# Patient Record
Sex: FEMALE | Race: WHITE | NOT HISPANIC OR LATINO | Employment: OTHER | URBAN - METROPOLITAN AREA
[De-identification: names, ages, dates, MRNs, and addresses within clinical notes are randomized per-mention and may not be internally consistent; named-entity substitution may affect disease eponyms.]

---

## 2017-01-27 ENCOUNTER — TRANSCRIBE ORDERS (OUTPATIENT)
Dept: DIABETES SERVICES | Facility: HOSPITAL | Age: 67
End: 2017-01-27

## 2017-01-27 DIAGNOSIS — R73.9 HYPERGLYCEMIA, UNSPECIFIED: Primary | ICD-10-CM

## 2017-02-07 ENCOUNTER — APPOINTMENT (OUTPATIENT)
Dept: DIABETES SERVICES | Facility: HOSPITAL | Age: 67
End: 2017-02-07

## 2017-02-07 PROCEDURE — 97802 MEDICAL NUTRITION INDIV IN: CPT | Performed by: DIETITIAN, REGISTERED

## 2017-03-07 ENCOUNTER — APPOINTMENT (OUTPATIENT)
Dept: DIABETES SERVICES | Facility: HOSPITAL | Age: 67
End: 2017-03-07

## 2017-04-04 ENCOUNTER — APPOINTMENT (OUTPATIENT)
Dept: DIABETES SERVICES | Facility: HOSPITAL | Age: 67
End: 2017-04-04

## 2017-06-30 ENCOUNTER — TRANSCRIBE ORDERS (OUTPATIENT)
Dept: ADMINISTRATIVE | Facility: HOSPITAL | Age: 67
End: 2017-06-30

## 2017-06-30 DIAGNOSIS — Z12.31 VISIT FOR SCREENING MAMMOGRAM: Primary | ICD-10-CM

## 2017-07-18 ENCOUNTER — HOSPITAL ENCOUNTER (OUTPATIENT)
Dept: MAMMOGRAPHY | Facility: HOSPITAL | Age: 67
Discharge: HOME | End: 2017-07-18
Admitting: NURSE PRACTITIONER

## 2017-07-18 ENCOUNTER — APPOINTMENT (OUTPATIENT)
Dept: OTHER | Facility: HOSPITAL | Age: 67
End: 2017-07-18

## 2017-07-18 DIAGNOSIS — Z92.89 H/O MAMMOGRAM: ICD-10-CM

## 2017-07-18 DIAGNOSIS — Z12.31 VISIT FOR SCREENING MAMMOGRAM: ICD-10-CM

## 2017-07-18 PROCEDURE — G0202 SCR MAMMO BI INCL CAD: HCPCS | Performed by: RADIOLOGY

## 2017-07-18 PROCEDURE — 77063 BREAST TOMOSYNTHESIS BI: CPT | Performed by: RADIOLOGY

## 2017-07-18 PROCEDURE — 77063 BREAST TOMOSYNTHESIS BI: CPT

## 2017-07-18 PROCEDURE — G0202 SCR MAMMO BI INCL CAD: HCPCS

## 2017-07-27 ENCOUNTER — TRANSCRIBE ORDERS (OUTPATIENT)
Dept: MAMMOGRAPHY | Facility: HOSPITAL | Age: 67
End: 2017-07-27

## 2017-07-27 ENCOUNTER — HOSPITAL ENCOUNTER (OUTPATIENT)
Dept: MAMMOGRAPHY | Facility: HOSPITAL | Age: 67
Discharge: HOME | End: 2017-07-27
Admitting: NURSE PRACTITIONER

## 2017-07-27 DIAGNOSIS — R92.8 ABNORMAL MAMMOGRAM: ICD-10-CM

## 2017-07-27 DIAGNOSIS — R92.8 ABNORMAL MAMMOGRAM: Primary | ICD-10-CM

## 2017-07-27 PROCEDURE — G0206 DX MAMMO INCL CAD UNI: HCPCS

## 2017-07-27 PROCEDURE — G0206 DX MAMMO INCL CAD UNI: HCPCS | Performed by: RADIOLOGY

## 2018-01-29 ENCOUNTER — TRANSCRIBE ORDERS (OUTPATIENT)
Dept: MAMMOGRAPHY | Facility: HOSPITAL | Age: 68
End: 2018-01-29

## 2018-01-29 ENCOUNTER — HOSPITAL ENCOUNTER (OUTPATIENT)
Dept: MAMMOGRAPHY | Facility: HOSPITAL | Age: 68
Discharge: HOME | End: 2018-01-29
Admitting: NURSE PRACTITIONER

## 2018-01-29 DIAGNOSIS — R92.8 ABNORMAL MAMMOGRAM: ICD-10-CM

## 2018-01-29 DIAGNOSIS — R92.8 ABNORMAL MAMMOGRAM: Primary | ICD-10-CM

## 2018-01-29 PROCEDURE — 77065 DX MAMMO INCL CAD UNI: CPT

## 2018-01-29 PROCEDURE — 77065 DX MAMMO INCL CAD UNI: CPT | Performed by: RADIOLOGY

## 2018-07-31 ENCOUNTER — APPOINTMENT (OUTPATIENT)
Dept: MAMMOGRAPHY | Facility: HOSPITAL | Age: 68
End: 2018-07-31

## 2018-08-06 ENCOUNTER — TRANSCRIBE ORDERS (OUTPATIENT)
Dept: MAMMOGRAPHY | Facility: HOSPITAL | Age: 68
End: 2018-08-06

## 2018-08-06 ENCOUNTER — HOSPITAL ENCOUNTER (OUTPATIENT)
Dept: MAMMOGRAPHY | Facility: HOSPITAL | Age: 68
Discharge: HOME | End: 2018-08-06
Admitting: NURSE PRACTITIONER

## 2018-08-06 DIAGNOSIS — R92.8 ABNORMAL MAMMOGRAM: Primary | ICD-10-CM

## 2018-08-06 DIAGNOSIS — R92.8 ABNORMAL MAMMOGRAM: ICD-10-CM

## 2018-08-06 PROCEDURE — G0279 TOMOSYNTHESIS, MAMMO: HCPCS | Performed by: RADIOLOGY

## 2018-08-06 PROCEDURE — 77066 DX MAMMO INCL CAD BI: CPT | Performed by: RADIOLOGY

## 2018-08-06 PROCEDURE — 77066 DX MAMMO INCL CAD BI: CPT

## 2018-08-06 PROCEDURE — G0279 TOMOSYNTHESIS, MAMMO: HCPCS

## 2018-09-14 ENCOUNTER — OFFICE VISIT (OUTPATIENT)
Dept: BARIATRICS/WEIGHT MGMT | Facility: CLINIC | Age: 68
End: 2018-09-14

## 2018-09-14 VITALS
TEMPERATURE: 98.1 F | HEIGHT: 64 IN | OXYGEN SATURATION: 99 % | SYSTOLIC BLOOD PRESSURE: 128 MMHG | WEIGHT: 257 LBS | HEART RATE: 78 BPM | BODY MASS INDEX: 43.87 KG/M2 | RESPIRATION RATE: 18 BRPM | DIASTOLIC BLOOD PRESSURE: 82 MMHG

## 2018-09-14 DIAGNOSIS — K21.9 GASTROESOPHAGEAL REFLUX DISEASE, ESOPHAGITIS PRESENCE NOT SPECIFIED: Primary | ICD-10-CM

## 2018-09-14 DIAGNOSIS — G47.33 OBSTRUCTIVE SLEEP APNEA SYNDROME: ICD-10-CM

## 2018-09-14 DIAGNOSIS — I10 ESSENTIAL HYPERTENSION: ICD-10-CM

## 2018-09-14 DIAGNOSIS — R13.10 DYSPHAGIA, UNSPECIFIED TYPE: ICD-10-CM

## 2018-09-14 DIAGNOSIS — M35.3 PMR (POLYMYALGIA RHEUMATICA) (HCC): ICD-10-CM

## 2018-09-14 DIAGNOSIS — E66.01 OBESITY, CLASS III, BMI 40-49.9 (MORBID OBESITY) (HCC): ICD-10-CM

## 2018-09-14 PROCEDURE — 99205 OFFICE O/P NEW HI 60 MIN: CPT | Performed by: SURGERY

## 2018-09-14 RX ORDER — TRAMADOL HYDROCHLORIDE 50 MG/1
50 TABLET ORAL EVERY 4 HOURS PRN
COMMUNITY

## 2018-09-14 RX ORDER — FOLIC ACID 1 MG/1
TABLET ORAL
COMMUNITY

## 2018-09-14 RX ORDER — LISINOPRIL AND HYDROCHLOROTHIAZIDE 25; 20 MG/1; MG/1
TABLET ORAL
COMMUNITY
End: 2018-09-20

## 2018-09-14 RX ORDER — LEVOTHYROXINE SODIUM 0.15 MG/1
150 TABLET ORAL DAILY
COMMUNITY

## 2018-09-14 RX ORDER — COVID-19 ANTIGEN TEST
KIT MISCELLANEOUS
COMMUNITY
End: 2018-09-14

## 2018-09-14 RX ORDER — METHYLPREDNISOLONE ACETATE 40 MG/ML
1 INJECTION, SUSPENSION INTRA-ARTICULAR; INTRALESIONAL; INTRAMUSCULAR; SOFT TISSUE
COMMUNITY
End: 2018-09-14

## 2018-09-14 RX ORDER — PREDNISONE 2.5 MG
TABLET ORAL
COMMUNITY

## 2018-09-18 ENCOUNTER — HOSPITAL ENCOUNTER (OUTPATIENT)
Dept: GENERAL RADIOLOGY | Facility: HOSPITAL | Age: 68
Discharge: HOME | End: 2018-09-18
Attending: SURGERY | Admitting: SURGERY

## 2018-09-18 ENCOUNTER — TELEPHONE (OUTPATIENT)
Dept: BARIATRICS/WEIGHT MGMT | Facility: CLINIC | Age: 68
End: 2018-09-18

## 2018-09-18 DIAGNOSIS — R13.10 DYSPHAGIA, UNSPECIFIED TYPE: ICD-10-CM

## 2018-09-18 PROCEDURE — 74241: CPT

## 2018-09-18 RX ADMIN — BARIUM SULFATE 183 ML: 960 POWDER, FOR SUSPENSION ORAL at 09:36

## 2018-09-19 DIAGNOSIS — R13.10 DYSPHAGIA, UNSPECIFIED TYPE: Primary | ICD-10-CM

## 2018-09-19 RX ORDER — SODIUM CHLORIDE 9 MG/ML
150 INJECTION, SOLUTION INTRAVENOUS CONTINUOUS
Status: CANCELLED | OUTPATIENT
Start: 2018-09-19

## 2018-09-19 NOTE — H&P
"[facility] Bariatric Surgery  [address]  [facility] [address] [phone]           Patient Name:  Erica Suggs.  :  1950        Date of Visit: 2018        Reason for Visit:   12 years postop AGB, weight regain, port pain     HPI: Erica Suggs is a 67 y.o. female s/p 2006 AGB (likely 4 cc capacity) by [facility], assisted by GDW.  Has lost 22 pounds recently on WW.  Highest weight before band 319, lowest weight was 250, gained up to 310, now down to 257.  Had had some recent LUQ pain that caused her to have to lie down and stretch out.  Has had port pain in the past so bad she couldn't touch the port, but this spontaneously resolved.  Occurred last week. Thinks she was seen around .  She thinks port is emptied.  +GERD this week.  Has required ranitidine past few nights.  Some dysphagia in the morning and with cold liquids x many years.  Either has to vomit the food or be miserable until it passes.  Worse with cold liquids and solid proteins.       In , per patient, the UGI was \"not good,\" and she didn't followup.  She is seeking band removal for chronic band intolerance.  She notes previous attempts at band access were difficult.       Has been on chronic steroids for polymyalgia rheumatica, on 5 mg daily prednisone for life, started 1 year ago on this dose. Follows with rheumatologist Dr. Carnes.  Also receives PRN prednisone shots.       Aware Dr. Samuels is practicing in [place], prefers to follow at this practice because it is closer to her home in [place].          Medical History        Past Medical History:   Diagnosis Date   • Back pain     • Fatigue     • Hypertension     • Hypothyroidism     • DANYEL (obstructive sleep apnea)       has not used CPAP since lost 20 pounds   • Osteoarthritis     • PMR (polymyalgia rheumatica) (CMS/[facility])           Surgical History         Past Surgical History:   Procedure Laterality Date   • BREAST EXCISIONAL " "BIOPSY Right 1992   • FRONTALIS SUSPENSION         eyelid lift   • GASTRIC BANDING       • LAPAROSCOPIC CHOLECYSTECTOMY         [facility], [place]         Active Medications          Outpatient Prescriptions Marked as Taking for the 9/14/18 encounter (Office Visit) with Milagros Watkins MD   Medication Sig Dispense Refill   • folic acid (FOLVITE) 1 MG tablet folic acid 1 mg tablet   Take 1 tablet every day by oral route.       • levothyroxine (SYNTHROID, LEVOTHROID) 150 MCG tablet Take 150 mcg by mouth Daily.       • lisinopril-hydrochlorothiazide (PRINZIDE,ZESTORETIC) 20-25 MG per tablet lisinopril 20 mg-hydrochlorothiazide 25 mg tablet   Take 1 tablet every day by oral route.       • methotrexate 2.5 MG tablet Take 10 mg by mouth 1 (One) Time Per Week.       • predniSONE (DELTASONE) 2.5 MG tablet prednisone 2.5 mg tablet   take 2 tablets daily       • traMADol (ULTRAM) 50 MG tablet Every 12 (Twelve) Hours.       • [DISCONTINUED] dextrose 5 % solution with levothyroxine sodium 100 MCG reconstituted solution 200 mcg IVPB levothyroxine   1 daily                Allergies no known allergies     Social History   Social History            Social History   • Marital status:        Spouse name: N/A   • Number of children: N/A   • Years of education: N/A          Occupational History   • Not on file.           Social History Main Topics   • Smoking status: Never Smoker   • Smokeless tobacco: Not on file   • Alcohol use No   • Drug use: No   • Sexual activity: Not on file           Other Topics Concern   • Not on file          Social History Narrative     Retired from [employer], .  Lives with .               /82 (BP Location: Left arm, Patient Position: Sitting, Cuff Size: Large Adult)   Pulse 78   Temp 98.1 °F (36.7 °C) (Temporal Artery )   Resp 18   Ht 161.3 cm (63.5\")   Wt 117 kg (257 lb)   SpO2 99%   BMI 44.81 kg/m²      Physical Exam   Constitutional: " She is oriented to person, place, and time. She appears well-developed and well-nourished. No distress.   HENT:   Head: Normocephalic and atraumatic.   Mouth/Throat: No oropharyngeal exudate.   Eyes: Pupils are equal, round, and reactive to light. Conjunctivae and EOM are normal.   Cardiovascular: Normal rate.    Pulmonary/Chest: Effort normal. No respiratory distress.   Abdominal: Soft.   Palpable port in LUQ, nontender/no erythema, scattered lap scars   Neurological: She is alert and oriented to person, place, and time. No cranial nerve deficit.   Skin: Skin is warm and dry. No rash noted. She is not diaphoretic. No erythema.   Psychiatric: She has a normal mood and affect. Her behavior is normal. Judgment and thought content normal.            Assessment:  12 years s/p 5/17/2006 AGB (likely 4 cc capacity) by [facility], assisted by GDW, now with GERD, port pain, dysphagia      ICD-10-CM ICD-9-CM   1. Gastroesophageal reflux disease, esophagitis presence not specified K21.9 530.81   2. Dysphagia, unspecified type R13.10 787.20   3. Essential hypertension I10 401.9   4. PMR (polymyalgia rheumatica) (CMS/[facility]) M35.3 725   5. Obstructive sleep apnea syndrome G47.33 327.23   6. Obesity, Class III, BMI 40-49.9 (morbid obesity) (CMS/[facility]) E66.01 278.01            Plan:    UGI, likely EGD afterwards.       MDM: high  Moderate, 4+ chronic diagnoses  Elective procedure, risk factor of HTN  Has not been seen >2 years, new patient visit.     Milagros Watkins MD      09/19/18 Update:    UGI reviewed.  Horizontal band, large stomach above band, c/w slip.  Will do EGD ASAP at  [facility].  Plan for ABGR as soon as approved by insurance.

## 2018-09-20 ENCOUNTER — ANESTHESIA EVENT (OUTPATIENT)
Dept: GASTROENTEROLOGY | Facility: HOSPITAL | Age: 68
End: 2018-09-20

## 2018-09-20 ENCOUNTER — APPOINTMENT (OUTPATIENT)
Dept: PREADMISSION TESTING | Facility: HOSPITAL | Age: 68
End: 2018-09-20

## 2018-09-20 PROBLEM — R13.10 DYSPHAGIA: Status: ACTIVE | Noted: 2018-09-20

## 2018-09-20 LAB
DEPRECATED RDW RBC AUTO: 44.4 FL (ref 37–54)
ERYTHROCYTE [DISTWIDTH] IN BLOOD BY AUTOMATED COUNT: 13.6 % (ref 11.3–14.5)
HCT VFR BLD AUTO: 38.4 % (ref 34.5–44)
HGB BLD-MCNC: 12.6 G/DL (ref 11.5–15.5)
MCH RBC QN AUTO: 29.6 PG (ref 27–31)
MCHC RBC AUTO-ENTMCNC: 32.8 G/DL (ref 32–36)
MCV RBC AUTO: 90.1 FL (ref 80–99)
PLATELET # BLD AUTO: 390 10*3/MM3 (ref 150–450)
PMV BLD AUTO: 10.1 FL (ref 6–12)
POTASSIUM BLD-SCNC: 4.4 MMOL/L (ref 3.5–5.5)
RBC # BLD AUTO: 4.26 10*6/MM3 (ref 3.89–5.14)
WBC NRBC COR # BLD: 14.45 10*3/MM3 (ref 3.5–10.8)

## 2018-09-20 PROCEDURE — 93005 ELECTROCARDIOGRAM TRACING: CPT

## 2018-09-20 PROCEDURE — 36415 COLL VENOUS BLD VENIPUNCTURE: CPT

## 2018-09-20 PROCEDURE — 93010 ELECTROCARDIOGRAM REPORT: CPT | Performed by: INTERNAL MEDICINE

## 2018-09-20 PROCEDURE — 84132 ASSAY OF SERUM POTASSIUM: CPT | Performed by: ANESTHESIOLOGY

## 2018-09-20 PROCEDURE — 85027 COMPLETE CBC AUTOMATED: CPT | Performed by: ANESTHESIOLOGY

## 2018-09-20 RX ORDER — LISINOPRIL AND HYDROCHLOROTHIAZIDE 20; 12.5 MG/1; MG/1
1 TABLET ORAL DAILY
COMMUNITY

## 2018-09-20 RX ORDER — AMITRIPTYLINE HYDROCHLORIDE 25 MG/1
25 TABLET, FILM COATED ORAL NIGHTLY PRN
COMMUNITY

## 2018-09-20 NOTE — PAT
pt has questions for patel before signing consent and has questions for anesthesia since has had issues in past.

## 2018-09-20 NOTE — DISCHARGE INSTRUCTIONS

## 2018-09-21 ENCOUNTER — HOSPITAL ENCOUNTER (OUTPATIENT)
Facility: HOSPITAL | Age: 68
Setting detail: HOSPITAL OUTPATIENT SURGERY
Discharge: HOME | End: 2018-09-21
Attending: SURGERY | Admitting: SURGERY

## 2018-09-21 ENCOUNTER — ANESTHESIA (OUTPATIENT)
Dept: GASTROENTEROLOGY | Facility: HOSPITAL | Age: 68
End: 2018-09-21

## 2018-09-21 VITALS
OXYGEN SATURATION: 96 % | DIASTOLIC BLOOD PRESSURE: 71 MMHG | RESPIRATION RATE: 18 BRPM | SYSTOLIC BLOOD PRESSURE: 116 MMHG | HEART RATE: 90 BPM | TEMPERATURE: 97.7 F

## 2018-09-21 DIAGNOSIS — R13.10 DYSPHAGIA, UNSPECIFIED TYPE: ICD-10-CM

## 2018-09-21 DIAGNOSIS — K21.9 GASTROESOPHAGEAL REFLUX DISEASE, ESOPHAGITIS PRESENCE NOT SPECIFIED: ICD-10-CM

## 2018-09-21 PROCEDURE — 43239 EGD BIOPSY SINGLE/MULTIPLE: CPT | Performed by: SURGERY

## 2018-09-21 PROCEDURE — 88305 TISSUE EXAM BY PATHOLOGIST: CPT | Performed by: SURGERY

## 2018-09-21 PROCEDURE — 25010000002 PROPOFOL 1000 MG/ML EMULSION: Performed by: NURSE ANESTHETIST, CERTIFIED REGISTERED

## 2018-09-21 RX ORDER — FAMOTIDINE 20 MG/1
20 TABLET, FILM COATED ORAL ONCE
Status: DISCONTINUED | OUTPATIENT
Start: 2018-09-21 | End: 2018-09-21 | Stop reason: HOSPADM

## 2018-09-21 RX ORDER — SODIUM CHLORIDE 9 MG/ML
150 INJECTION, SOLUTION INTRAVENOUS CONTINUOUS
Status: DISCONTINUED | OUTPATIENT
Start: 2018-09-21 | End: 2018-09-21 | Stop reason: HOSPADM

## 2018-09-21 RX ORDER — PROMETHAZINE HYDROCHLORIDE 25 MG/ML
12.5 INJECTION, SOLUTION INTRAMUSCULAR; INTRAVENOUS ONCE AS NEEDED
Status: DISCONTINUED | OUTPATIENT
Start: 2018-09-21 | End: 2018-09-21 | Stop reason: HOSPADM

## 2018-09-21 RX ORDER — SODIUM CHLORIDE 0.9 % (FLUSH) 0.9 %
1-10 SYRINGE (ML) INJECTION AS NEEDED
Status: DISCONTINUED | OUTPATIENT
Start: 2018-09-21 | End: 2018-09-21 | Stop reason: HOSPADM

## 2018-09-21 RX ORDER — LIDOCAINE HYDROCHLORIDE 10 MG/ML
0.5 INJECTION, SOLUTION EPIDURAL; INFILTRATION; INTRACAUDAL; PERINEURAL ONCE AS NEEDED
Status: DISCONTINUED | OUTPATIENT
Start: 2018-09-21 | End: 2018-09-21 | Stop reason: HOSPADM

## 2018-09-21 RX ORDER — FENTANYL CITRATE 50 UG/ML
50 INJECTION, SOLUTION INTRAMUSCULAR; INTRAVENOUS
Status: DISCONTINUED | OUTPATIENT
Start: 2018-09-21 | End: 2018-09-21 | Stop reason: HOSPADM

## 2018-09-21 RX ORDER — SODIUM CHLORIDE, SODIUM LACTATE, POTASSIUM CHLORIDE, CALCIUM CHLORIDE 600; 310; 30; 20 MG/100ML; MG/100ML; MG/100ML; MG/100ML
9 INJECTION, SOLUTION INTRAVENOUS CONTINUOUS
Status: DISCONTINUED | OUTPATIENT
Start: 2018-09-21 | End: 2018-09-21 | Stop reason: HOSPADM

## 2018-09-21 RX ORDER — ONDANSETRON 2 MG/ML
4 INJECTION INTRAMUSCULAR; INTRAVENOUS ONCE AS NEEDED
Status: DISCONTINUED | OUTPATIENT
Start: 2018-09-21 | End: 2018-09-21 | Stop reason: HOSPADM

## 2018-09-21 RX ORDER — FAMOTIDINE 10 MG/ML
20 INJECTION, SOLUTION INTRAVENOUS ONCE
Status: COMPLETED | OUTPATIENT
Start: 2018-09-21 | End: 2018-09-21

## 2018-09-21 RX ADMIN — PROPOFOL 150 MCG/KG/MIN: 10 INJECTION, EMULSION INTRAVENOUS at 09:03

## 2018-09-21 RX ADMIN — FAMOTIDINE 20 MG: 10 INJECTION, SOLUTION INTRAVENOUS at 08:08

## 2018-09-21 RX ADMIN — SODIUM CHLORIDE 500 ML: 9 INJECTION, SOLUTION INTRAVENOUS at 08:09

## 2018-09-21 NOTE — ANESTHESIA POSTPROCEDURE EVALUATION
Patient: Erica Suggs    Procedure Summary     Date:  09/21/18 Room / Location:   [facility]    Anesthesia Start:  0901 Anesthesia Stop:      Procedure:  ESOPHAGOGASTRODUODENOSCOPY WITH BIOPSY (N/A ) Diagnosis:       Dysphagia, unspecified type      (Dysphagia, unspecified type [R13.10])    Surgeon:  Milagros Watkins MD Provider:  Bam Christina MD    Anesthesia Type:  general ASA Status:  3          Anesthesia Type: general  Last vitals  BP   100/62   Temp   98   Pulse   90   Resp   18     SpO2   95     Post Anesthesia Care and Evaluation    Patient location during evaluation: PACU  Patient participation: complete - patient participated  Level of consciousness: awake and responsive to verbal stimuli  Pain score: 2  Pain management: adequate  Airway patency: patent  Anesthetic complications: No anesthetic complications    Cardiovascular status: acceptable  Respiratory status: acceptable  Hydration status: acceptable    Comments: Pt awake and responsive. SV. VSS. Report to RN. Patient Vitals in the past 24 hrs:  09/21/18 0804, BP:142/96, Temp:98.1 °F (36.7 °C), Temp src:Temporal Art, Pulse:99, Resp:20, SpO2:99 %  133/78. p 72. r 16. t 98.1

## 2018-09-21 NOTE — OP NOTE
PROCEDURE NOTE.    Date: 09/21/18    Patient: Erica Suggs     Surgeon: Milagros Watkins MD      Preoperative Diagnosis: Dysphagia, band intolerance     Postoperative Diagnosis: Eroded adjustable gastric band     Procedure Performed: Esophagogastroduodenoscopy with biopsy     Findings: GE junction 38 cm.  No hiatal hernia. Eroded adjustable gastric band, estimated 90% of its circumference intragastric, with associated inflammation and ulceration.  Small fistula, superior to band, that could not be intubated with scope.      Specimens: Antral and distal esophageal biopsies.       Indications: Erica Suggs is a 67 y.o. year old supermorbidly obese female who has history of adjustable gastric band placed 5/17/2006 by Dr. Samuels.  She had not been seen in the office in some time, and has history of daily prednisone use for polymyalgia rheumatica.  She has had symptoms for some time of GERD, dysphagia, vomiting, but they have worsened the past few weeks.  She also has port pain.  UGI was concerning for horizontally oriented band and slip. She is here today for diagnostic endoscopy.       Procedure:      After informed consent was taken, the patient was brought to the operating room and placed in the supine position. MAC was given by anesthesia staff. A bite block was placed and time out performed. A flexible endoscope was passed transorally down to the second portion of the duodenum without difficulty. The scope was slowly withdrawn and the anatomy examined with photodocumentation throughout. The duodenum was normal. The pylorus was without spasm. The antrum of the stomach was without significant gastritis. A biopsy was taken to rule out H. Pylori. The scope was retroflexed and the body, fundus, and cardia were examined. Here, as when I first entered the stomach, could be seen a blackish discolored gastric band, with about 90% of its circumference in the stomach.  The surrounding stomach was inflamed and  ulcerated.  The scope was withdrawn back into the esophagus after decompressing the stomach. The GE junction was at 38 cm and the distal esophagus showed no evidence of reflux esophagitis. Biopsy was taken. There was no obvious hiatal hernia. The scope was further withdrawn. There was no other esophageal abnormality. The vocal cords were normal. The scope was withdrawn completely and the procedure concluded. The patient was awakened and taken to recovery in good condition.      Recommendations: Will contact the patient to set up removal of eroded adjustable gastric band.

## 2018-09-21 NOTE — H&P (VIEW-ONLY)
"[facility] Bariatric Surgery  [address]  [facility] [address] [phone]           Patient Name:  Erica Suggs.  :  1950        Date of Visit: 2018        Reason for Visit:   12 years postop AGB, weight regain, port pain     HPI: Erica Suggs is a 67 y.o. female s/p 2006 AGB (likely 4 cc capacity) by [facility], assisted by GDW.  Has lost 22 pounds recently on WW.  Highest weight before band 319, lowest weight was 250, gained up to 310, now down to 257.  Had had some recent LUQ pain that caused her to have to lie down and stretch out.  Has had port pain in the past so bad she couldn't touch the port, but this spontaneously resolved.  Occurred last week. Thinks she was seen around .  She thinks port is emptied.  +GERD this week.  Has required ranitidine past few nights.  Some dysphagia in the morning and with cold liquids x many years.  Either has to vomit the food or be miserable until it passes.  Worse with cold liquids and solid proteins.       In , per patient, the UGI was \"not good,\" and she didn't followup.  She is seeking band removal for chronic band intolerance.  She notes previous attempts at band access were difficult.       Has been on chronic steroids for polymyalgia rheumatica, on 5 mg daily prednisone for life, started 1 year ago on this dose. Follows with rheumatologist Dr. Carnes.  Also receives PRN prednisone shots.       Aware Dr. Samuels is practicing in [place], prefers to follow at this practice because it is closer to her home in [place].          Medical History        Past Medical History:   Diagnosis Date   • Back pain     • Fatigue     • Hypertension     • Hypothyroidism     • DANYEL (obstructive sleep apnea)       has not used CPAP since lost 20 pounds   • Osteoarthritis     • PMR (polymyalgia rheumatica) (CMS/[facility])           Surgical History         Past Surgical History:   Procedure Laterality Date   • BREAST EXCISIONAL " "BIOPSY Right 1992   • FRONTALIS SUSPENSION         eyelid lift   • GASTRIC BANDING       • LAPAROSCOPIC CHOLECYSTECTOMY         [facility], [place]         Active Medications          Outpatient Prescriptions Marked as Taking for the 9/14/18 encounter (Office Visit) with Milagros Watkins MD   Medication Sig Dispense Refill   • folic acid (FOLVITE) 1 MG tablet folic acid 1 mg tablet   Take 1 tablet every day by oral route.       • levothyroxine (SYNTHROID, LEVOTHROID) 150 MCG tablet Take 150 mcg by mouth Daily.       • lisinopril-hydrochlorothiazide (PRINZIDE,ZESTORETIC) 20-25 MG per tablet lisinopril 20 mg-hydrochlorothiazide 25 mg tablet   Take 1 tablet every day by oral route.       • methotrexate 2.5 MG tablet Take 10 mg by mouth 1 (One) Time Per Week.       • predniSONE (DELTASONE) 2.5 MG tablet prednisone 2.5 mg tablet   take 2 tablets daily       • traMADol (ULTRAM) 50 MG tablet Every 12 (Twelve) Hours.       • [DISCONTINUED] dextrose 5 % solution with levothyroxine sodium 100 MCG reconstituted solution 200 mcg IVPB levothyroxine   1 daily                Allergies no known allergies     Social History   Social History            Social History   • Marital status:        Spouse name: N/A   • Number of children: N/A   • Years of education: N/A          Occupational History   • Not on file.           Social History Main Topics   • Smoking status: Never Smoker   • Smokeless tobacco: Not on file   • Alcohol use No   • Drug use: No   • Sexual activity: Not on file           Other Topics Concern   • Not on file          Social History Narrative     Retired from [employer], .  Lives with .               /82 (BP Location: Left arm, Patient Position: Sitting, Cuff Size: Large Adult)   Pulse 78   Temp 98.1 °F (36.7 °C) (Temporal Artery )   Resp 18   Ht 161.3 cm (63.5\")   Wt 117 kg (257 lb)   SpO2 99%   BMI 44.81 kg/m²      Physical Exam   Constitutional: " She is oriented to person, place, and time. She appears well-developed and well-nourished. No distress.   HENT:   Head: Normocephalic and atraumatic.   Mouth/Throat: No oropharyngeal exudate.   Eyes: Pupils are equal, round, and reactive to light. Conjunctivae and EOM are normal.   Cardiovascular: Normal rate.    Pulmonary/Chest: Effort normal. No respiratory distress.   Abdominal: Soft.   Palpable port in LUQ, nontender/no erythema, scattered lap scars   Neurological: She is alert and oriented to person, place, and time. No cranial nerve deficit.   Skin: Skin is warm and dry. No rash noted. She is not diaphoretic. No erythema.   Psychiatric: She has a normal mood and affect. Her behavior is normal. Judgment and thought content normal.            Assessment:  12 years s/p 5/17/2006 AGB (likely 4 cc capacity) by [facility], assisted by GDW, now with GERD, port pain, dysphagia      ICD-10-CM ICD-9-CM   1. Gastroesophageal reflux disease, esophagitis presence not specified K21.9 530.81   2. Dysphagia, unspecified type R13.10 787.20   3. Essential hypertension I10 401.9   4. PMR (polymyalgia rheumatica) (CMS/[facility]) M35.3 725   5. Obstructive sleep apnea syndrome G47.33 327.23   6. Obesity, Class III, BMI 40-49.9 (morbid obesity) (CMS/[facility]) E66.01 278.01            Plan:    UGI, likely EGD afterwards.       MDM: high  Moderate, 4+ chronic diagnoses  Elective procedure, risk factor of HTN  Has not been seen >2 years, new patient visit.     Milagros Watkins MD      09/19/18 Update:    UGI reviewed.  Horizontal band, large stomach above band, c/w slip.  Will do EGD ASAP at  [facility].  Plan for ABGR as soon as approved by insurance.

## 2018-09-21 NOTE — ANESTHESIA PREPROCEDURE EVALUATION
Anesthesia Evaluation     Patient summary reviewed and Nursing notes reviewed   history of anesthetic complications: PONV  NPO Solid Status: > 8 hours  NPO Liquid Status: > 8 hours           Airway   Mallampati: III  TM distance: >3 FB  Neck ROM: full  Possible difficult intubation  Dental      Pulmonary    (+) shortness of breath, sleep apnea on CPAP,   (-) COPD, asthma, recent URI, not a smoker, lung cancer  Cardiovascular     (+) hypertension,   (-) past MI, CAD, angina, CHF, cardiac stentsDysrhythmias: remote.      Neuro/Psych  GI/Hepatic/Renal/Endo    (+) morbid obesity, GERD,  hypothyroidism,     Musculoskeletal     (+) back pain,   Abdominal    Substance History      OB/GYN          Other   (+) arthritis     ROS/Med Hx Other: Recall under LMAC for colon etc explained that this more likley I under MAC  No recall under GA for lap band                 Anesthesia Plan    ASA 3     general   (Prop(ofol)  intravenous induction   Anesthetic plan, all risks, benefits, and alternatives have been provided, discussed and informed consent has been obtained with: patient.    Plan discussed with CRNA.

## 2018-09-24 LAB
CYTO UR: NORMAL
LAB AP CASE REPORT: NORMAL
LAB AP CLINICAL INFORMATION: NORMAL
PATH REPORT.FINAL DX SPEC: NORMAL
PATH REPORT.GROSS SPEC: NORMAL

## 2018-10-01 ENCOUNTER — TELEPHONE (OUTPATIENT)
Dept: BARIATRICS/WEIGHT MGMT | Facility: CLINIC | Age: 68
End: 2018-10-01

## 2018-10-01 NOTE — TELEPHONE ENCOUNTER
Notified pt that her EGD showed an eroded band which needs to be removed and her bx showed mild irration of the stomach and a normal esophagus. Pt verbalized understanding.

## 2018-10-01 NOTE — TELEPHONE ENCOUNTER
Notified pt that she needs to come into the office to have an updated H&P and to discuss her options.  I let pt know that Dr. Samuels her original surgeon is now in [place] if she prefers to go there, but if she prefers to stay in [place] you propose endoscopic band removal and you need to give her some info. Pt stated that she wants to stay in [place].  Pt is scheduling with Cris now to do an updated H&P with you.

## 2018-10-01 NOTE — TELEPHONE ENCOUNTER
Pt wants to know the results of her EGD and Biopsy.  Pt stated she doesn't want to wait till her appointment to know the results.  Please advise, thank you.

## 2018-10-02 ENCOUNTER — OFFICE VISIT (OUTPATIENT)
Dept: BARIATRICS/WEIGHT MGMT | Facility: CLINIC | Age: 68
End: 2018-10-02

## 2018-10-02 VITALS
WEIGHT: 251.5 LBS | BODY MASS INDEX: 42.94 KG/M2 | HEIGHT: 64 IN | RESPIRATION RATE: 18 BRPM | TEMPERATURE: 97.8 F | DIASTOLIC BLOOD PRESSURE: 72 MMHG | OXYGEN SATURATION: 99 % | SYSTOLIC BLOOD PRESSURE: 120 MMHG | HEART RATE: 120 BPM

## 2018-10-02 DIAGNOSIS — K95.09 GASTRIC BAND EROSION: Primary | ICD-10-CM

## 2018-10-02 PROCEDURE — 99214 OFFICE O/P EST MOD 30 MIN: CPT | Performed by: PHYSICIAN ASSISTANT

## 2018-10-09 DIAGNOSIS — K95.09 COMPLICATION OF GASTRIC BAND PROCEDURE: Primary | ICD-10-CM

## 2018-10-09 NOTE — PROGRESS NOTES
"[facility] Bariatric Surgery  [address]  [facility] [address] [phone]        Patient Name:  Erica Suggs.  :  1950      Date of Visit: 10/02/2018      Reason for Visit:  AGB followup    HPI:  Erica Suggs is a 67 y.o. female s/p LAGB REG 2006 [facility]    Presented 18 after extended absence.  Saw Dr. Watkins.  Aware Dr. Samuels is practicing in [place], but said preferred to follow here as it was closer to home.  Wanted to discuss band removal. c/o LUQ pain, intermittent port pain, chronic dysphagia/vomiting.  Further eval includes:    UGI 18 @ [facility] - reviewed by Dr. Watkins - band horizontal w/ large, gas-filled pouch above.    EGD 18 w/ Dr. Watkins revealed \"eroded adjustable gastric band, estimated 90% of its circumference intragastric, with associated inflammation and ulceration.  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 GASTRIC ANTRUM BIOPSY:  Antral and body mucosa with congestion and mild chronic inflammation.  Negative for Helicobacter pylori, intestinal metaplasia, dysplasia or malignancy.   2. DISTAL ESOPHAGUS BIOPSY:  Squamous mucosa with no diagnostic abnormality.  No intraepithelial eosinophils.  Negative for intestinal metaplasia, dysplasia or malignancy.     Gastric band removal advised.  Patient added on today (per patient request) to discuss findings further.  She is anxious and worried and wants band removed as soon as possible.  Symptoms unchanged.  Currently denies port pain.  No fevers/chills.      Past Medical History:   Diagnosis Date   • Anesthesia complication     wakes up during surgery since pt has shallow breathing and has to give less meds so starts waking up and feeling pain- talk with patient before surgery    • Back pain    • Cataract     right - mild still present    • Fatigue    • GERD (gastroesophageal reflux disease)    • Hypertension    • " Hypothyroidism    • Osteoarthritis    • PMR (polymyalgia rheumatica) (CMS/[facility])     daily Prednisone 5mg, w/ wkly MTX, followed by Dr. Carnes @ [facility]   • PONV (postoperative nausea and vomiting)     meds helped   • Right knee pain    • Sleep apnea     doesnt use machine    • SOBOE (shortness of breath on exertion)    • Tooth wear     implants top x2- right side side by side   • Wears glasses      Past Surgical History:   Procedure Laterality Date   • BREAST EXCISIONAL BIOPSY Right 1992   • COLONOSCOPY     • ENDOSCOPY N/A 9/21/2018    Procedure: ESOPHAGOGASTRODUODENOSCOPY WITH BIOPSY;  Surgeon: Milagros Watkins MD;  Location: [facility] END[facility]COPY;  Service: Gastroenterology   • FRONTALIS SUSPENSION      eyelid lift   • GASTRIC BANDING  2006    s/p LAGB REG 5/2006 [facility]   • LAPAROSCOPIC CHOLECYSTECTOMY  2007    [facility]   • WISDOM TOOTH EXTRACTION      all 4       Allergies   Allergen Reactions   • Demerol [Meperidine] Other (See Comments)     Had issues during anesthesia        Outpatient Prescriptions Marked as Taking for the 10/2/18 encounter (Office Visit) with Pinky Bustos PA   Medication Sig Dispense Refill   • amitriptyline (ELAVIL) 25 MG tablet Take 25 mg by mouth At Night As Needed for Sleep.     • Cholecalciferol (VITAMIN D3) 1000 units capsule Take 1 capsule by mouth Daily.     • folic acid (FOLVITE) 1 MG tablet folic acid 1 mg tablet   Take 1 tablet every day by oral route.     • levothyroxine (SYNTHROID, LEVOTHROID) 150 MCG tablet Take 150 mcg by mouth Daily.     • lisinopril-hydrochlorothiazide (PRINZIDE,ZESTORETIC) 20-12.5 MG per tablet Take 1 tablet by mouth Daily.     • methotrexate 2.5 MG tablet Take 10 mg by mouth 1 (One) Time Per Week. Thursday     • Multiple Vitamins-Minerals (OCUVITE EXTRA PO) Take 1 capsule by mouth Daily.     • predniSONE (DELTASONE) 2.5 MG tablet prednisone 2.5 mg tablet   take 2 tablets daily     • traMADol (ULTRAM) 50 MG tablet Take 50 mg by mouth Every 4 (Four) Hours  "As Needed for Moderate Pain .         Social History     Social History   • Marital status:      Spouse name: N/A   • Number of children: N/A   • Years of education: N/A     Occupational History   • Not on file.     Social History Main Topics   • Smoking status: Never Smoker   • Smokeless tobacco: Never Used   • Alcohol use No   • Drug use: No   • Sexual activity: Defer     Other Topics Concern   • Not on file     Social History Narrative    Retired from [employer], .  Lives with  in [place], [place]         /72 (BP Location: Left arm, Patient Position: Sitting, Cuff Size: Large Adult)   Pulse 120   Temp 97.8 °F (36.6 °C) (Temporal Artery )   Resp 18   Ht 161.3 cm (63.5\")   Wt 114 kg (251 lb 8 oz)   SpO2 99%   BMI 43.85 kg/m²     Physical Exam   Constitutional: She appears well-developed and well-nourished. She is cooperative.   HENT:   Mouth/Throat: Oropharynx is clear and moist and mucous membranes are normal.   Eyes: Conjunctivae are normal. No scleral icterus.   Cardiovascular: Normal rate.    Pulmonary/Chest: Effort normal.   Abdominal: Soft. There is no tenderness.   LUQ port - site unremarkable - nontender   Musculoskeletal: Normal range of motion. She exhibits no edema.   Neurological: She is alert.   Skin: Skin is warm and dry. No rash noted.   Psychiatric: She has a normal mood and affect. Judgment normal.       Assessment: 66 y/o s/p LAGB REG 5/2006 [facility] w/ band erosion.      Plan:  Dr. Watkins advises endoscopic band removal w/ open port removal.  Patient did initially request Dr. Guo to perform her surgery, but when I discussed Dr. Watkins's plan for endoscopic band removal she said, \"oh yes, that sounds better.\"  She and her  both agreed that would be their preferred intervention and seemed very comfortable w/ the plan when leaving the office.    Will coordinate w/ Dr. Frazier.  Per Dr. Watkins, will schedule in main OR - 2 hr " case - 23 hr observation.  Will need periop abx + Lovenox. Can continue steroids and methotrexate.  No Tramadol x 1 week prior.  Will schedule once insurance approval obtained and able to coordinate surgery time for both Dr. Watkins and Dr. Frazier.     All was discussed w/ patient.  Additionally instructed her to notify us if symptoms acutely worsened (severe abd.pain, N/V, fevers/chills).  Call w/ any additional issues/concerns.     NADINE Lam

## 2018-10-10 ENCOUNTER — TELEPHONE (OUTPATIENT)
Dept: BARIATRICS/WEIGHT MGMT | Facility: CLINIC | Age: 68
End: 2018-10-10

## 2018-10-10 ENCOUNTER — DOCUMENTATION (OUTPATIENT)
Dept: BARIATRICS/WEIGHT MGMT | Facility: CLINIC | Age: 68
End: 2018-10-10

## 2018-10-10 NOTE — PROGRESS NOTES
10/10/18      Update:      Ms. Suggs is a 66 yo F s/p LAGB REG 5/2006 JSO.  She returned to our office 9/14/18 for followup with complaints of port pain and weight regain.  She previously had not seen her bariatric surgeon in 12 years, and had no recommended yearly surveillance UGIs to make sure no band complications.      Workup was started with UGI on 9/18/18, which was concerning for slip given abnormal band angle.  EGD by me on 9/21/18, which was set up expeditiously due to the findings on her UGI, revealed a 90% circumference erosion of the band with gastric ulceration.      I discussed the findings of the EGD with the patient face-to-face in PACU.  We discussed her high risk for surgery, being on chronic steroids and immunosuppressives, which she did not feel she could stop due to her chronic pain from polymyalgia rheumatica.  I told her her options were to return to her original surgeon in [place], have laparoscopic band removal here, or if available have endoscopic band removal.  She was clear that she wanted to stay in [place] for care, so I asked her to give me some time to coordinate a possible endoscopic removal if possible and she was agreeable.  I told her I would call her when we had more information.  This case was discussed with Dr. Guo, the Bariatric , who agreed with the plan for endoscopic removal of band, as it would be the option with the least risk to her, especially given her chronic steroid use.      I discussed the case with Dr. Frazier of gastroenterology, and my PA also discussed the case with his PA in order to coordinate care.  He was confident that he could remove the band endoscopically, and our offices were coordinating to set this up on a date that we could both be there (he asked me to remove the port at the same time and cut the band tubing so as to aid in endoscopic removal of band).      Today the patient called, very irate, that she was not being listened  to, and reports she is lodging a complaint with administration.  We explained that her port removal/endoscopic band removal would likely take place in about 2 weeks as that is when eric and Dr. Frazier's schedules were the best, and it was not emergent.  Any delay was in the coordination of care between the two offices, and we explained that we are actively communicating to get her procedure done.  She is not interested in waiting or receiving care at [facility] any longer, and is going to see Dr. Andino in [place].      I will communicate with Dr. Frazier today about the cessation of care, and that we do not need his services to remove her band.

## 2018-10-10 NOTE — TELEPHONE ENCOUNTER
Erica Suggs NATHANIEL [birth year] called today to inquire if her band removal had been approved through insurance. I apologized to her that I was unfamiliar with her case. After reviewing her information there was no order for surgery.   The patient became very upset and began saying that we did not care about her and that she can’t believe that our office would treat her so poorly. I once again apologized and let her continue her concerns.  I then explained that I would need to look into the situation and get back with her.   I went to  and asked her if she needed me to pre cert a band removal.  explained that the case was complicated. That the pat needed to have surgery that would involve GI and GW. And that she was working on coordinating the case.  I returned Erica’s call and explained that I had spoken with  and her case was being worked out. Erica said to tell  to STOP working on her case that she would find another doctor. Also that she would be calling [place] to complain about our office. I apologized to her and said I was sorry that she felt that way. She then hung up on me.

## 2019-02-07 ENCOUNTER — APPOINTMENT (OUTPATIENT)
Dept: MAMMOGRAPHY | Facility: HOSPITAL | Age: 69
End: 2019-02-07
Attending: RADIOLOGY

## 2019-05-14 ENCOUNTER — HOSPITAL ENCOUNTER (OUTPATIENT)
Dept: MAMMOGRAPHY | Facility: HOSPITAL | Age: 69
Discharge: HOME | End: 2019-05-14
Attending: RADIOLOGY | Admitting: RADIOLOGY

## 2019-05-14 DIAGNOSIS — R92.8 ABNORMAL MAMMOGRAM: ICD-10-CM

## 2019-05-14 PROCEDURE — G0279 TOMOSYNTHESIS, MAMMO: HCPCS

## 2019-05-14 PROCEDURE — 77065 DX MAMMO INCL CAD UNI: CPT | Performed by: RADIOLOGY

## 2019-05-14 PROCEDURE — 77065 DX MAMMO INCL CAD UNI: CPT

## 2019-07-18 ENCOUNTER — TRANSCRIBE ORDERS (OUTPATIENT)
Dept: ADMINISTRATIVE | Facility: HOSPITAL | Age: 69
End: 2019-07-18

## 2019-07-18 DIAGNOSIS — Z12.31 VISIT FOR SCREENING MAMMOGRAM: Primary | ICD-10-CM

## 2020-05-18 ENCOUNTER — HOSPITAL ENCOUNTER (OUTPATIENT)
Dept: MAMMOGRAPHY | Facility: HOSPITAL | Age: 70
End: 2020-05-18

## 2020-06-17 ENCOUNTER — TRANSCRIBE ORDERS (OUTPATIENT)
Dept: ADMINISTRATIVE | Facility: HOSPITAL | Age: 70
End: 2020-06-17

## 2020-06-17 ENCOUNTER — HOSPITAL ENCOUNTER (OUTPATIENT)
Dept: MAMMOGRAPHY | Facility: HOSPITAL | Age: 70
End: 2020-06-17

## 2020-06-17 DIAGNOSIS — Z12.31 VISIT FOR SCREENING MAMMOGRAM: Primary | ICD-10-CM

## 2020-09-03 ENCOUNTER — HOSPITAL ENCOUNTER (OUTPATIENT)
Dept: MAMMOGRAPHY | Facility: HOSPITAL | Age: 70
Discharge: HOME | End: 2020-09-03
Admitting: NURSE PRACTITIONER

## 2020-09-03 DIAGNOSIS — Z12.31 VISIT FOR SCREENING MAMMOGRAM: ICD-10-CM

## 2020-09-03 PROCEDURE — 77067 SCR MAMMO BI INCL CAD: CPT

## 2020-09-03 PROCEDURE — 77067 SCR MAMMO BI INCL CAD: CPT | Performed by: RADIOLOGY

## 2020-09-03 PROCEDURE — 77063 BREAST TOMOSYNTHESIS BI: CPT

## 2020-09-03 PROCEDURE — 77063 BREAST TOMOSYNTHESIS BI: CPT | Performed by: RADIOLOGY

## 2021-09-15 ENCOUNTER — TRANSCRIBE ORDERS (OUTPATIENT)
Dept: ADMINISTRATIVE | Facility: HOSPITAL | Age: 71
End: 2021-09-15

## 2021-09-15 DIAGNOSIS — Z12.31 VISIT FOR SCREENING MAMMOGRAM: Primary | ICD-10-CM

## 2021-09-30 ENCOUNTER — HOSPITAL ENCOUNTER (OUTPATIENT)
Dept: MAMMOGRAPHY | Facility: HOSPITAL | Age: 71
Discharge: HOME | End: 2021-09-30
Admitting: FAMILY MEDICINE

## 2021-09-30 DIAGNOSIS — Z12.31 VISIT FOR SCREENING MAMMOGRAM: ICD-10-CM

## 2021-09-30 PROCEDURE — 77067 SCR MAMMO BI INCL CAD: CPT | Performed by: RADIOLOGY

## 2021-09-30 PROCEDURE — 77067 SCR MAMMO BI INCL CAD: CPT

## 2021-09-30 PROCEDURE — 77063 BREAST TOMOSYNTHESIS BI: CPT | Performed by: RADIOLOGY

## 2021-09-30 PROCEDURE — 77063 BREAST TOMOSYNTHESIS BI: CPT

## 2021-11-18 ENCOUNTER — TRANSCRIBE ORDERS (OUTPATIENT)
Dept: MAMMOGRAPHY | Facility: HOSPITAL | Age: 71
End: 2021-11-18

## 2021-11-18 ENCOUNTER — HOSPITAL ENCOUNTER (OUTPATIENT)
Dept: MAMMOGRAPHY | Facility: HOSPITAL | Age: 71
Discharge: HOME | End: 2021-11-18
Admitting: RADIOLOGY

## 2021-11-18 DIAGNOSIS — R92.8 ABNORMAL MAMMOGRAM: ICD-10-CM

## 2021-11-18 DIAGNOSIS — R92.8 ABNORMAL MAMMOGRAM: Primary | ICD-10-CM

## 2021-11-18 PROCEDURE — 77065 DX MAMMO INCL CAD UNI: CPT | Performed by: RADIOLOGY

## 2021-11-18 PROCEDURE — 77065 DX MAMMO INCL CAD UNI: CPT

## 2021-12-16 ENCOUNTER — HOSPITAL ENCOUNTER (OUTPATIENT)
Dept: MAMMOGRAPHY | Facility: HOSPITAL | Age: 71
Discharge: HOME | End: 2021-12-16

## 2021-12-16 DIAGNOSIS — R92.8 ABNORMAL MAMMOGRAM: ICD-10-CM

## 2021-12-16 PROCEDURE — A4648 IMPLANTABLE TISSUE MARKER: HCPCS

## 2021-12-16 PROCEDURE — 0 LIDOCAINE 1 % SOLUTION: Performed by: RADIOLOGY

## 2021-12-16 PROCEDURE — 76098 X-RAY EXAM SURGICAL SPECIMEN: CPT

## 2021-12-16 PROCEDURE — 88305 TISSUE EXAM BY PATHOLOGIST: CPT | Performed by: FAMILY MEDICINE

## 2021-12-16 PROCEDURE — 19081 BX BREAST 1ST LESION STRTCTC: CPT | Performed by: RADIOLOGY

## 2021-12-16 PROCEDURE — 77065 DX MAMMO INCL CAD UNI: CPT | Performed by: RADIOLOGY

## 2021-12-16 RX ORDER — LIDOCAINE HYDROCHLORIDE 10 MG/ML
5 INJECTION, SOLUTION INFILTRATION; PERINEURAL ONCE
Status: COMPLETED | OUTPATIENT
Start: 2021-12-16 | End: 2021-12-16

## 2021-12-16 RX ORDER — LIDOCAINE HYDROCHLORIDE AND EPINEPHRINE 10; 10 MG/ML; UG/ML
10 INJECTION, SOLUTION INFILTRATION; PERINEURAL ONCE
Status: COMPLETED | OUTPATIENT
Start: 2021-12-16 | End: 2021-12-16

## 2021-12-16 RX ADMIN — Medication 5 ML: at 12:20

## 2021-12-16 RX ADMIN — LIDOCAINE HYDROCHLORIDE,EPINEPHRINE BITARTRATE 5 ML: 10; .01 INJECTION, SOLUTION INFILTRATION; PERINEURAL at 12:20

## 2021-12-16 NOTE — PROGRESS NOTES
Alert and orientated. Denies discomfort. No active bleeding. Steri-strips intact, gauze dressing applied. Ice packs given. Verbalizes and demonstrates understanding of post-care instructions, written copy given.

## 2021-12-17 LAB
CYTO UR: NORMAL
LAB AP CASE REPORT: NORMAL
LAB AP CLINICAL INFORMATION: NORMAL
LAB AP DIAGNOSIS COMMENT: NORMAL
PATH REPORT.FINAL DX SPEC: NORMAL
PATH REPORT.GROSS SPEC: NORMAL

## 2021-12-20 ENCOUNTER — TELEPHONE (OUTPATIENT)
Dept: NURSERY | Facility: HOSPITAL | Age: 71
End: 2021-12-20

## 2021-12-20 ENCOUNTER — TRANSCRIBE ORDERS (OUTPATIENT)
Dept: MAMMOGRAPHY | Facility: HOSPITAL | Age: 71
End: 2021-12-20

## 2021-12-20 DIAGNOSIS — R92.8 ABNORMAL MAMMOGRAM: Primary | ICD-10-CM

## 2022-07-22 ENCOUNTER — HOSPITAL ENCOUNTER (OUTPATIENT)
Dept: MAMMOGRAPHY | Facility: HOSPITAL | Age: 72
Discharge: HOME | End: 2022-07-22
Admitting: FAMILY MEDICINE

## 2022-07-22 DIAGNOSIS — R92.8 ABNORMAL MAMMOGRAM: ICD-10-CM

## 2022-07-22 PROCEDURE — 77065 DX MAMMO INCL CAD UNI: CPT

## 2022-07-22 PROCEDURE — 77065 DX MAMMO INCL CAD UNI: CPT | Performed by: RADIOLOGY

## 2022-07-22 PROCEDURE — G0279 TOMOSYNTHESIS, MAMMO: HCPCS | Performed by: RADIOLOGY

## 2023-08-11 NOTE — PROGRESS NOTES
"[facility]  [address]  [facility] [id number] [phone]        Patient Name:  Erica Suggs.  :  1950      Date of Visit: 2018      Reason for Visit:   12 years postop AGB, weight regain, port pain    HPI: Erica Suggs is a 67 y.o. female s/p 2006 AGB (likely 4 cc capacity) by JSO, assisted by GDW.  Has lost 22 pounds recently on WW.  Highest weight before band 319, lowest weight was 250, gained up to 310, now down to 257.  Had had some recent LUQ pain that caused her to have to lie down and stretch out.  Has had port pain in the past so bad she couldn't touch the port, but this spontaneously resolved.  Occurred last week. Thinks she was seen around .  She thinks port is emptied.  +GERD this week.  Has required ranitidine past few nights.  Some dysphagia in the morning and with cold liquids x many years.  Either has to vomit the food or be miserable until it passes.  Worse with cold liquids and solid proteins.      In , per patient, the UGI was \"not good,\" and she didn't followup.  She is seeking band removal for chronic band intolerance.  She notes previous attempts at band access were difficult.      Has been on chronic steroids for polymyalgia rheumatica, on 5 mg daily prednisone for life, started 1 year ago on this dose. Follows with rheumatologist Dr. Carnes.  Also receives PRN prednisone shots.      Aware Dr. Samuels is practicing in [place], prefers to follow at this practice because it is closer to her home in [place].        Past Medical History:   Diagnosis Date   • Back pain    • Fatigue    • Hypertension    • Hypothyroidism    • DANYEL (obstructive sleep apnea)     has not used CPAP since lost 20 pounds   • Osteoarthritis    • PMR (polymyalgia rheumatica) (CMS/[facility])      Past Surgical History:   Procedure Laterality Date   • BREAST EXCISIONAL BIOPSY Right    • FRONTALIS SUSPENSION      eyelid lift   • GASTRIC " "BANDING     • LAPAROSCOPIC CHOLECYSTECTOMY      [facility]     Outpatient Prescriptions Marked as Taking for the 9/14/18 encounter (Office Visit) with Milagros Watkins MD   Medication Sig Dispense Refill   • folic acid (FOLVITE) 1 MG tablet folic acid 1 mg tablet   Take 1 tablet every day by oral route.     • levothyroxine (SYNTHROID, LEVOTHROID) 150 MCG tablet Take 150 mcg by mouth Daily.     • lisinopril-hydrochlorothiazide (PRINZIDE,ZESTORETIC) 20-25 MG per tablet lisinopril 20 mg-hydrochlorothiazide 25 mg tablet   Take 1 tablet every day by oral route.     • methotrexate 2.5 MG tablet Take 10 mg by mouth 1 (One) Time Per Week.     • predniSONE (DELTASONE) 2.5 MG tablet prednisone 2.5 mg tablet   take 2 tablets daily     • traMADol (ULTRAM) 50 MG tablet Every 12 (Twelve) Hours.     • [DISCONTINUED] dextrose 5 % solution with levothyroxine sodium 100 MCG reconstituted solution 200 mcg IVPB levothyroxine   1 daily         Allergies no known allergies    Social History     Social History   • Marital status:      Spouse name: N/A   • Number of children: N/A   • Years of education: N/A     Occupational History   • Not on file.     Social History Main Topics   • Smoking status: Never Smoker   • Smokeless tobacco: Not on file   • Alcohol use No   • Drug use: No   • Sexual activity: Not on file     Other Topics Concern   • Not on file     Social History Narrative    Retired from [employer], .  Lives with .          /82 (BP Location: Left arm, Patient Position: Sitting, Cuff Size: Large Adult)   Pulse 78   Temp 98.1 °F (36.7 °C) (Temporal Artery )   Resp 18   Ht 161.3 cm (63.5\")   Wt 117 kg (257 lb)   SpO2 99%   BMI 44.81 kg/m²     Physical Exam   Constitutional: She is oriented to person, place, and time. She appears well-developed and well-nourished. No distress.   HENT:   Head: Normocephalic and atraumatic.   Mouth/Throat: No oropharyngeal " exudate.   Eyes: Pupils are equal, round, and reactive to light. Conjunctivae and EOM are normal.   Cardiovascular: Normal rate.    Pulmonary/Chest: Effort normal. No respiratory distress.   Abdominal: Soft.   Palpable port in LUQ, nontender/no erythema, scattered lap scars   Neurological: She is alert and oriented to person, place, and time. No cranial nerve deficit.   Skin: Skin is warm and dry. No rash noted. She is not diaphoretic. No erythema.   Psychiatric: She has a normal mood and affect. Her behavior is normal. Judgment and thought content normal.         Assessment:  12 years s/p 5/17/2006 AGB (likely 4 cc capacity) by JSO, assisted by GDW, now with GERD, port pain, dysphagia    ICD-10-CM ICD-9-CM   1. Gastroesophageal reflux disease, esophagitis presence not specified K21.9 530.81   2. Dysphagia, unspecified type R13.10 787.20   3. Essential hypertension I10 401.9   4. PMR (polymyalgia rheumatica) (CMS/[facility]) M35.3 725   5. Obstructive sleep apnea syndrome G47.33 327.23   6. Obesity, Class III, BMI 40-49.9 (morbid obesity) (CMS/[facility]) E66.01 278.01         Plan:    UGI, likely EGD afterwards.      MDM: high  Moderate, 4+ chronic diagnoses  Elective procedure, risk factor of HTN  Has not been seen >2 years, new patient visit.    Milagros Watkins MD   Pt would benefit from intensive rehab for aphasia.

## 2023-12-07 ENCOUNTER — TRANSCRIBE ORDERS (OUTPATIENT)
Dept: ADMINISTRATIVE | Facility: HOSPITAL | Age: 73
End: 2023-12-07
Payer: MEDICARE

## 2023-12-07 DIAGNOSIS — Z12.31 VISIT FOR SCREENING MAMMOGRAM: Primary | ICD-10-CM

## 2024-01-10 ENCOUNTER — HOSPITAL ENCOUNTER (OUTPATIENT)
Dept: MAMMOGRAPHY | Facility: HOSPITAL | Age: 74
Discharge: HOME | End: 2024-01-10
Admitting: FAMILY MEDICINE

## 2024-01-10 DIAGNOSIS — Z12.31 VISIT FOR SCREENING MAMMOGRAM: ICD-10-CM

## 2024-01-10 PROCEDURE — 77067 SCR MAMMO BI INCL CAD: CPT

## 2024-01-10 PROCEDURE — 77063 BREAST TOMOSYNTHESIS BI: CPT

## 2024-12-10 ENCOUNTER — TRANSCRIBE ORDERS (OUTPATIENT)
Dept: ADMINISTRATIVE | Facility: HOSPITAL | Age: 74
End: 2024-12-10
Payer: MEDICARE

## 2024-12-10 DIAGNOSIS — Z12.31 ENCOUNTER FOR SCREENING MAMMOGRAM FOR BREAST CANCER: Primary | ICD-10-CM

## 2025-02-27 ENCOUNTER — HOSPITAL ENCOUNTER (OUTPATIENT)
Dept: MAMMOGRAPHY | Facility: HOSPITAL | Age: 75
Discharge: HOME | End: 2025-02-27
Admitting: FAMILY MEDICINE
Payer: MEDICARE

## 2025-02-27 DIAGNOSIS — Z12.31 ENCOUNTER FOR SCREENING MAMMOGRAM FOR BREAST CANCER: ICD-10-CM

## 2025-02-27 PROCEDURE — 77067 SCR MAMMO BI INCL CAD: CPT

## 2025-02-27 PROCEDURE — 77063 BREAST TOMOSYNTHESIS BI: CPT

## (undated) DEVICE — Device: Brand: DEFENDO AIR/WATER/SUCTION AND BIOPSY VALVE

## (undated) DEVICE — THE BITE BLOCK MAXI, LATEX FREE STRAP IS USED TO PROTECT THE ENDOSCOPE INSERTION TUBE FROM BEING BITTEN BY THE PATIENT.

## (undated) DEVICE — SINGLE-USE BIOPSY FORCEPS: Brand: RADIAL JAW 4